# Patient Record
Sex: MALE | Race: OTHER | NOT HISPANIC OR LATINO
[De-identification: names, ages, dates, MRNs, and addresses within clinical notes are randomized per-mention and may not be internally consistent; named-entity substitution may affect disease eponyms.]

---

## 2021-07-28 ENCOUNTER — TRANSCRIPTION ENCOUNTER (OUTPATIENT)
Age: 66
End: 2021-07-28

## 2021-07-28 ENCOUNTER — INPATIENT (INPATIENT)
Facility: HOSPITAL | Age: 66
LOS: 0 days | Discharge: HOME | DRG: 287 | End: 2021-07-28
Attending: HOSPITALIST | Admitting: INTERNAL MEDICINE
Payer: COMMERCIAL

## 2021-07-28 VITALS — SYSTOLIC BLOOD PRESSURE: 130 MMHG | RESPIRATION RATE: 18 BRPM | HEART RATE: 62 BPM | DIASTOLIC BLOOD PRESSURE: 74 MMHG

## 2021-07-28 VITALS — HEIGHT: 70 IN | WEIGHT: 179.9 LBS

## 2021-07-28 DIAGNOSIS — R07.9 CHEST PAIN, UNSPECIFIED: ICD-10-CM

## 2021-07-28 LAB
ALBUMIN SERPL ELPH-MCNC: 3.8 G/DL — SIGNIFICANT CHANGE UP (ref 3.3–5.2)
ALP SERPL-CCNC: 96 U/L — SIGNIFICANT CHANGE UP (ref 40–120)
ALT FLD-CCNC: 14 U/L — SIGNIFICANT CHANGE UP
ANION GAP SERPL CALC-SCNC: 9 MMOL/L — SIGNIFICANT CHANGE UP (ref 5–17)
AST SERPL-CCNC: 18 U/L — SIGNIFICANT CHANGE UP
BASOPHILS # BLD AUTO: 0.05 K/UL — SIGNIFICANT CHANGE UP (ref 0–0.2)
BASOPHILS NFR BLD AUTO: 0.7 % — SIGNIFICANT CHANGE UP (ref 0–2)
BILIRUB SERPL-MCNC: 0.5 MG/DL — SIGNIFICANT CHANGE UP (ref 0.4–2)
BUN SERPL-MCNC: 13.3 MG/DL — SIGNIFICANT CHANGE UP (ref 8–20)
CALCIUM SERPL-MCNC: 9.5 MG/DL — SIGNIFICANT CHANGE UP (ref 8.6–10.2)
CHLORIDE SERPL-SCNC: 103 MMOL/L — SIGNIFICANT CHANGE UP (ref 98–107)
CO2 SERPL-SCNC: 27 MMOL/L — SIGNIFICANT CHANGE UP (ref 22–29)
CREAT SERPL-MCNC: 0.93 MG/DL — SIGNIFICANT CHANGE UP (ref 0.5–1.3)
EOSINOPHIL # BLD AUTO: 0.94 K/UL — HIGH (ref 0–0.5)
EOSINOPHIL NFR BLD AUTO: 13.3 % — HIGH (ref 0–6)
GLUCOSE SERPL-MCNC: 98 MG/DL — SIGNIFICANT CHANGE UP (ref 70–99)
HCT VFR BLD CALC: 44.7 % — SIGNIFICANT CHANGE UP (ref 39–50)
HGB BLD-MCNC: 14.4 G/DL — SIGNIFICANT CHANGE UP (ref 13–17)
IMM GRANULOCYTES NFR BLD AUTO: 1.4 % — SIGNIFICANT CHANGE UP (ref 0–1.5)
LYMPHOCYTES # BLD AUTO: 1.71 K/UL — SIGNIFICANT CHANGE UP (ref 1–3.3)
LYMPHOCYTES # BLD AUTO: 24.2 % — SIGNIFICANT CHANGE UP (ref 13–44)
MCHC RBC-ENTMCNC: 29.1 PG — SIGNIFICANT CHANGE UP (ref 27–34)
MCHC RBC-ENTMCNC: 32.2 GM/DL — SIGNIFICANT CHANGE UP (ref 32–36)
MCV RBC AUTO: 90.5 FL — SIGNIFICANT CHANGE UP (ref 80–100)
MONOCYTES # BLD AUTO: 0.61 K/UL — SIGNIFICANT CHANGE UP (ref 0–0.9)
MONOCYTES NFR BLD AUTO: 8.6 % — SIGNIFICANT CHANGE UP (ref 2–14)
NEUTROPHILS # BLD AUTO: 3.66 K/UL — SIGNIFICANT CHANGE UP (ref 1.8–7.4)
NEUTROPHILS NFR BLD AUTO: 51.8 % — SIGNIFICANT CHANGE UP (ref 43–77)
PLATELET # BLD AUTO: 183 K/UL — SIGNIFICANT CHANGE UP (ref 150–400)
POTASSIUM SERPL-MCNC: 4.2 MMOL/L — SIGNIFICANT CHANGE UP (ref 3.5–5.3)
POTASSIUM SERPL-SCNC: 4.2 MMOL/L — SIGNIFICANT CHANGE UP (ref 3.5–5.3)
PROT SERPL-MCNC: 7.3 G/DL — SIGNIFICANT CHANGE UP (ref 6.6–8.7)
RBC # BLD: 4.94 M/UL — SIGNIFICANT CHANGE UP (ref 4.2–5.8)
RBC # FLD: 13.9 % — SIGNIFICANT CHANGE UP (ref 10.3–14.5)
SARS-COV-2 RNA SPEC QL NAA+PROBE: SIGNIFICANT CHANGE UP
SODIUM SERPL-SCNC: 139 MMOL/L — SIGNIFICANT CHANGE UP (ref 135–145)
TROPONIN T SERPL-MCNC: <0.01 NG/ML — SIGNIFICANT CHANGE UP (ref 0–0.06)
WBC # BLD: 7.07 K/UL — SIGNIFICANT CHANGE UP (ref 3.8–10.5)
WBC # FLD AUTO: 7.07 K/UL — SIGNIFICANT CHANGE UP (ref 3.8–10.5)

## 2021-07-28 PROCEDURE — 93010 ELECTROCARDIOGRAM REPORT: CPT

## 2021-07-28 PROCEDURE — 71046 X-RAY EXAM CHEST 2 VIEWS: CPT | Mod: 26

## 2021-07-28 PROCEDURE — 99285 EMERGENCY DEPT VISIT HI MDM: CPT

## 2021-07-28 PROCEDURE — 84484 ASSAY OF TROPONIN QUANT: CPT

## 2021-07-28 PROCEDURE — C1769: CPT

## 2021-07-28 PROCEDURE — 93458 L HRT ARTERY/VENTRICLE ANGIO: CPT

## 2021-07-28 PROCEDURE — C1894: CPT

## 2021-07-28 PROCEDURE — 99153 MOD SED SAME PHYS/QHP EA: CPT

## 2021-07-28 PROCEDURE — 99223 1ST HOSP IP/OBS HIGH 75: CPT

## 2021-07-28 PROCEDURE — C1887: CPT

## 2021-07-28 PROCEDURE — 80053 COMPREHEN METABOLIC PANEL: CPT

## 2021-07-28 PROCEDURE — 71046 X-RAY EXAM CHEST 2 VIEWS: CPT

## 2021-07-28 PROCEDURE — 99152 MOD SED SAME PHYS/QHP 5/>YRS: CPT

## 2021-07-28 PROCEDURE — 93005 ELECTROCARDIOGRAM TRACING: CPT

## 2021-07-28 PROCEDURE — 85025 COMPLETE CBC W/AUTO DIFF WBC: CPT

## 2021-07-28 PROCEDURE — 36415 COLL VENOUS BLD VENIPUNCTURE: CPT

## 2021-07-28 PROCEDURE — 87635 SARS-COV-2 COVID-19 AMP PRB: CPT

## 2021-07-28 RX ORDER — LISINOPRIL 2.5 MG/1
1 TABLET ORAL
Qty: 0 | Refills: 0 | DISCHARGE

## 2021-07-28 RX ORDER — ROSUVASTATIN CALCIUM 5 MG/1
5 TABLET ORAL
Qty: 450 | Refills: 0
Start: 2021-07-28 | End: 2021-10-25

## 2021-07-28 RX ORDER — LISINOPRIL 2.5 MG/1
10 TABLET ORAL DAILY
Refills: 0 | Status: DISCONTINUED | OUTPATIENT
Start: 2021-07-28 | End: 2021-07-28

## 2021-07-28 RX ORDER — FLUTICASONE PROPIONATE 50 MCG
1 SPRAY, SUSPENSION NASAL
Qty: 0 | Refills: 0 | DISCHARGE

## 2021-07-28 RX ORDER — METOPROLOL TARTRATE 50 MG
50 TABLET ORAL DAILY
Refills: 0 | Status: DISCONTINUED | OUTPATIENT
Start: 2021-07-28 | End: 2021-07-28

## 2021-07-28 RX ORDER — ISOSORBIDE MONONITRATE 60 MG/1
15 TABLET, EXTENDED RELEASE ORAL DAILY
Refills: 0 | Status: DISCONTINUED | OUTPATIENT
Start: 2021-07-28 | End: 2021-07-28

## 2021-07-28 RX ORDER — FLUTICASONE PROPIONATE AND SALMETEROL 50; 250 UG/1; UG/1
1 POWDER ORAL; RESPIRATORY (INHALATION)
Qty: 0 | Refills: 0 | DISCHARGE

## 2021-07-28 RX ORDER — MONTELUKAST 4 MG/1
1 TABLET, CHEWABLE ORAL
Qty: 0 | Refills: 0 | DISCHARGE

## 2021-07-28 RX ORDER — ISOSORBIDE MONONITRATE 60 MG/1
15 TABLET, EXTENDED RELEASE ORAL
Qty: 90 | Refills: 3
Start: 2021-07-28 | End: 2022-07-22

## 2021-07-28 RX ORDER — ISOSORBIDE DINITRATE 5 MG/1
10 TABLET ORAL THREE TIMES A DAY
Refills: 0 | Status: DISCONTINUED | OUTPATIENT
Start: 2021-07-28 | End: 2021-07-28

## 2021-07-28 RX ORDER — DESVENLAFAXINE 50 MG/1
1 TABLET, EXTENDED RELEASE ORAL
Qty: 0 | Refills: 0 | DISCHARGE

## 2021-07-28 RX ORDER — METOPROLOL TARTRATE 50 MG
50 TABLET ORAL
Refills: 0 | Status: DISCONTINUED | OUTPATIENT
Start: 2021-07-28 | End: 2021-07-28

## 2021-07-28 RX ORDER — ISOSORBIDE DINITRATE 5 MG/1
1 TABLET ORAL
Qty: 0 | Refills: 0 | DISCHARGE

## 2021-07-28 RX ORDER — METOPROLOL TARTRATE 50 MG
1 TABLET ORAL
Qty: 0 | Refills: 0 | DISCHARGE

## 2021-07-28 RX ORDER — CLOPIDOGREL BISULFATE 75 MG/1
1 TABLET, FILM COATED ORAL
Qty: 90 | Refills: 3
Start: 2021-07-28 | End: 2022-07-22

## 2021-07-28 RX ORDER — CLOPIDOGREL BISULFATE 75 MG/1
1 TABLET, FILM COATED ORAL
Qty: 0 | Refills: 0 | DISCHARGE

## 2021-07-28 RX ORDER — CLOPIDOGREL BISULFATE 75 MG/1
75 TABLET, FILM COATED ORAL
Refills: 0 | Status: DISCONTINUED | OUTPATIENT
Start: 2021-07-28 | End: 2021-07-28

## 2021-07-28 RX ORDER — ASPIRIN/CALCIUM CARB/MAGNESIUM 324 MG
1 TABLET ORAL
Qty: 0 | Refills: 0 | DISCHARGE

## 2021-07-28 RX ORDER — ASPIRIN/CALCIUM CARB/MAGNESIUM 324 MG
81 TABLET ORAL DAILY
Refills: 0 | Status: DISCONTINUED | OUTPATIENT
Start: 2021-07-28 | End: 2021-07-28

## 2021-07-28 NOTE — ED PROVIDER NOTE - NS ED MD DISPO DIVISION
"Nataliia Menon LPN    " [facility]

## 2021-07-28 NOTE — ED PROVIDER NOTE - CLINICAL SUMMARY MEDICAL DECISION MAKING FREE TEXT BOX
chest pain while exercising this morning. ekg no acute ischemia. labs, cxr, pt contacted his cardiologist who is coming to see him in hospital. reassess

## 2021-07-28 NOTE — DISCHARGE NOTE PROVIDER - INSTRUCTIONS
Your diet should be low in fat, cholesterol, salt and carbohydrates, increase fruits (caution if diabetic), vegetables and whole grains/fiber rich foods. Take your medications for cholesterol as prescribed.

## 2021-07-28 NOTE — ED ADULT NURSE NOTE - OBJECTIVE STATEMENT
65y male PMHX of asthma, HTN, sleep apnea;  AOx4 c/o midsternal chest pain while riding exercise bike with SOB and generalized fatigue. pt contacted cardiologist Dr. Michele, pt instructed to come to ED for workup. respirations even and unlabored. denies chest discomfort. abd soft and nondistended. 65y male PMHX of asthma, HTN, sleep apnea;  AOx4 c/o midsternal chest pain while riding exercise bike with SOB and generalized fatigue. pt see's cardiologist Dr. aly for previous cardiac cath workup, found to have osteolesion, no stents placed. pt contacted cardiologist Dr. Aly, pt instructed to come to ED for eval. respirations even and unlabored, O2 sat 100% on room air. HR WNL approx 64 bpm. abd soft and nondistended, nontender to palpation. skin WNL for race. 65y male PMHX of asthma, HTN, sleep apnea;  AOx4 c/o midsternal chest pain while riding exercise bike with SOB and generalized fatigue. pt see's cardiologist Dr. aly for previous cardiac cath workup, found to have ostial lesion, no stents placed. pt contacted cardiologist Dr. Aly, pt instructed to come to ED for eval. respirations even and unlabored, O2 sat 100% on room air. HR WNL approx 64 bpm. abd soft and nondistended, nontender to palpation. skin WNL for race.

## 2021-07-28 NOTE — H&P ADULT - HISTORY OF PRESENT ILLNESS
64 yo M w/ hx CAD (cath 2017 with obstruction but not amenable to intervention) presents to ER for chest pain associated with shortness of breath.  Initially thought due to indigestion but pain now radiating to the jaw.  Has noticed pain/sob comes during exercise and noted progressive decline in his exercise tolerance.  seen by cardiology in ER, planned for inpatient cath.

## 2021-07-28 NOTE — DISCHARGE NOTE PROVIDER - CARE PROVIDER_API CALL
Bria Lawson)  Cardiovascular Disease; Critical Care Medicine; Internal Medicine; Interventional Cardiology  [address] [place] [address]  Phone: [phone]  Fax: [phone]  Follow Up Time:

## 2021-07-28 NOTE — DISCHARGE NOTE PROVIDER - NSDCCPCAREPLAN_GEN_ALL_CORE_FT
PRINCIPAL DISCHARGE DIAGNOSIS  Diagnosis: CAD (coronary artery disease)  Assessment and Plan of Treatment: Follow up with Dr. Lawson to discuss the best course of treatment to manage your coronary artery disease. Continue to take your cardiac medications as prescribed. Start crestor 5mg po daily. Take Imdur 15mg po daily. Go to the ED with any acute onset of chest pain, palpitations, shortness of breath or dizziness. Do NOT miss a dose or stop taking your Aspirin and Plavix, these medications prevent a heart attack. If anyone tells you to stop these medications, speak to your cardiologist immediately.  Managing risk factors will help prevent future blockages, risk factors may include: high blood pressure, high cholesterol, obesity, sedentary life style and smoking.    Your diet should be low in fat, cholesterol, salt and carbohydrates, increase fruits (caution if diabetic), vegetables and whole grains/fiber rich foods.   Take all your cardiac  medications as prescribed.    Do not engage in strenuous activity unless you have clearance from your cardiologist.    Follow up with your cardiologist within 1-2 weeks after your procedure.   Call your cardiologist or our unit ([phone]) with any questions or concerns that may arise.      SECONDARY DISCHARGE DIAGNOSES  Diagnosis: HTN (hypertension)  Assessment and Plan of Treatment: Continue to take antihypertensive medications as prescribed. Obtain a home blood pressure monitor (at any pharmacy or medical supply store) and monitor blood pressure trends. Call your doctor if you note you blood pressure to be running much higher or lower than usual. Limit salt intake.    Diagnosis: HLD (hyperlipidemia)  Assessment and Plan of Treatment: Your diet should be low in fat, cholesterol, salt and carbohydrates, increase fruits (caution if diabetic), vegetables and whole grains/fiber rich foods. Take your cholesterol lowering medications as prescribed. Routine follow up lipid panels

## 2021-07-28 NOTE — PROGRESS NOTE ADULT - SUBJECTIVE AND OBJECTIVE BOX
Department of Cardiology                                                                  [facility]/[place] [address] [address] [facility]                                                            Telephone: [phone]. Fax:[phone]                                                    Post- Procedure Note: Left Heart Cardiac Catheterization       Narrative:  65y  Male now s/p LHC via RRA with complex 2V CAD (LAD/LCx), collateral from the right system, procedure performed by  Dr. Lawson, received IA heparin, Fentanyl and Versed IV intraprocedurally, arrived to recovery in NAD and HDS, RRA access site stable, no bleed/hematoma, distal pulse +, plan for D/C home after recovery period completed and will follow up with Dr. Lawson in the office to further discuss the best course of management: complex PCI vs CABG vs medical management.          PAST MEDICAL & SURGICAL HISTORY:  Asthma  HTN (hypertension)  Sleep apnea        Home Medications:  Advair Diskus 100 mcg-50 mcg inhalation powder: 1 puff(s) inhaled 2 times a day (28 Jul 2021 13:35)  Aspirin Enteric Coated 81 mg oral delayed release tablet: 1 tab(s) orally once a day (28 Jul 2021 13:35)  Flonase Allergy Relief 50 mcg/inh nasal spray: 1 spray(s) nasal once a day (28 Jul 2021 13:35)  isosorbide dinitrate 10 mg oral tablet: 1 tab(s) orally once a day (28 Jul 2021 13:35)  lisinopril 10 mg oral tablet: 1 tab(s) orally once a day (28 Jul 2021 13:35)  Metoprolol Succinate ER 50 mg oral tablet, extended release: 1 tab(s) orally once a day (28 Jul 2021 13:35)  montelukast 10 mg oral tablet: 1 tab(s) orally once a day (28 Jul 2021 13:35)  Plavix 75 mg oral tablet: 1 tab(s) orally 2 times a week (28 Jul 2021 13:35)  Pristiq 50 mg oral tablet, extended release: 1 tab(s) orally once a day (28 Jul 2021 13:35)    Allergy: quinolines (Unknown)      Objective:  Vital Signs Last 24 Hrs  T(C): 36.7 (28 Jul 2021 13:30), Max: 36.7 (28 Jul 2021 13:30)  T(F): 98 (28 Jul 2021 13:30), Max: 98 (28 Jul 2021 13:30)  HR: 67 (28 Jul 2021 16:40) (60 - 67)  BP: 152/91 (28 Jul 2021 16:40) (151/84 - 157/91)  BP(mean): --  RR: 18 (28 Jul 2021 16:40) (15 - 18)  SpO2: 98% (28 Jul 2021 16:40) (98% - 100%)      PHYSICAL EXAM:  Constitutional: A & O x 3, NAD  HEENT:  Normal oral mucosa, PERRL, EOMI	  Cardiovascular: S1 S2, No murmurs, No JVD  Respiratory: Lungs clear to auscultation	  Gastrointestinal:  Soft, Non-tender, + BS	  Skin: No rashes or cyanosis  Neurologic: No deficit appreciated  Extremities: Normal range of motion, no edema  Vascular: Access site stable, no bleed or hematoma, distal pulses +     Labs:                           14.4   7.07  )-----------( 183      ( 28 Jul 2021 09:12 )             44.7     07-28    139  |  103  |  13.3  ----------------------------<  98  4.2   |  27.0  |  0.93    Ca    9.5      28 Jul 2021 09:12    TPro  7.3  /  Alb  3.8  /  TBili  0.5  /  DBili  x   /  AST  18  /  ALT  14  /  AlkPhos  96  07-28          Assessment: 64yo male with h/o Htn, Asthma, FITO on CPAP, prior cath 2017 reportedly with obstructive CAD however, not amenable to intervention at that time and treated medically, now presents to the ED c/o exertional chest pain radiating to the jaw a/w SOB and endorses recent reduced ET, now plan for LHC to be performed by Dr. Lawson.      Now s/p LHC via RRA with complex 2V CAD (LAD/LCx), collateral from the right system, procedure performed by  Dr. Lawson, received IA heparin, Fentanyl and Versed IV intraprocedurally, arrived to recovery in NAD and HDS, RRA access site stable, no bleed/hematoma, distal pulse +, plan for D/C home after recovery period completed and will follow up with Dr. Lawson in the office to further discuss the best course of management: complex PCI vs CABG vs medical management.          Plan:  -Formal cath report pending  -Post procedure management/monitoring per protocol  -Access site precautions  -Radial compression band removal at 1730  -Bedrest x 1hours post procedure  -Continue current medical therapy  -Cont anti-platelet therapy with aspirin/plavix (Dr Lawson will discuss stopping Plavix if plan for CABG)  -Cont BB with Toprol 50mg po daily   -Cont Ismo daily  -Start statin therapy with Crestor 5mg qHS  -Dr Lawson will further review and adjust meds during office visit   -Educated regarding strict adherence with DAPT   -Educated regarding post procedure management and care  -Discussed the importance of RF modification  -F/U outpt in 1-2 weeks with Cardiologist Dr. Lawson  -DISPO: Plan for D/C today after recovery period completed

## 2021-07-28 NOTE — DISCHARGE NOTE PROVIDER - HOSPITAL COURSE
Assessment: 64yo male with h/o Htn, Asthma, FITO on CPAP, prior cath 2017 reportedly with obstructive CAD however, not amenable to intervention at that time and treated medically, now presents to the ED c/o exertional chest pain radiating to the jaw a/w SOB and endorses recent reduced ET, now plan for LHC to be performed by Dr. Lawson.        Plan:  -plan for LHC via RA vs FA  -patient seen and examined  -confirmed appropriate NPO duration  -ECG and Labs reviewed  -Aspirin 81mg and Plavix 75mg po pre-cath  -procedure discussed with patient; risks and benefits explained, questions answered  -consent obtained by attending IC      Now s/p LHC via RRA with complex 2V CAD (LAD/LCx), collateral from the right system, procedure performed by  Dr. Lawson, received IA heparin, Fentanyl and Versed IV intraprocedurally, arrived to recovery in NAD and HDS, RRA access site stable, no bleed/hematoma, distal pulse +, plan for D/C home after recovery period completed and will follow up with Dr. Lawson in the office to further discuss the best course of management: complex PCI vs CABG vs medical management.          Plan:  -Formal cath report pending  -Post procedure management/monitoring per protocol  -Access site precautions  -Radial compression band removal at 1730  -Bedrest x 1hours post procedure  -Continue current medical therapy  -Cont anti-platelet therapy with aspirin/plavix (Dr Lawson will discuss stopping Plavix if plan for CABG)  -Cont BB with Toprol 50mg po daily   -Cont Ismo daily  -Start statin therapy with Crestor 5mg qHS  -Dr Lawson will further review and adjust meds during office visit   -Educated regarding strict adherence with DAPT   -Educated regarding post procedure management and care  -Discussed the importance of RF modification  -F/U outpt in 1-2 weeks with Cardiologist Dr. Lawson  -DISPO: Plan for D/C today after recovery period completed

## 2021-07-28 NOTE — DISCHARGE NOTE PROVIDER - NSDCFUADDINST_GEN_ALL_CORE_FT
Go to the ED with any acute onset of chest pain, palpitations, shortness of breath or dizziness.   Managing risk factors will help prevent future blockages, risk factors may include: high blood pressure, high cholesterol, obesity, sedentary life style and smoking.    Your diet should be low in fat, cholesterol, salt and carbohydrates, increase fruits (caution if diabetic), vegetables and whole grains/fiber rich foods.   Take all your cardiac  medications as prescribed.    Restricted use with no heavy lifting of affected arm for 48 hours.  No submerging the arm in water for 48 hours.  You may start showering today.  Call your doctor for any bleeding, swelling, loss of sensation in the hand or fingers, or fingers turning blue.  If heavy bleeding or large lumps form, hold pressure at the spot and come to the Emergency Room.    Exercise is a very important factor in heart health. Once your post procedure restrictions have passed, you should engage in heart healthy, aerobic exercise. Be sure to have clearance from your cardiologist. Cardiac rehab programs could be extremely beneficial and your cardiologist could help set this up.   Follow up with your cardiologist within 1-2 weeks after your procedure.   Call your cardiologist or our unit ([phone]) with any questions or concerns that may arise.

## 2021-07-28 NOTE — H&P ADULT - ASSESSMENT
66 yo M w/ hx CAD (cath 2017 with obstruction but not amenable to intervention) presents to ER for chest pain associated with shortness of breath.  Initially thought due to indigestion but pain now radiating to the jaw.  Has noticed pain/sob comes during exercise and noted progressive decline in his exercise tolerance.  seen by cardiology in ER, planned for inpatient cath.       chest pain, shortness of breath: concern for stable angina    cath today      hx CAD: c/w aspirin/lopressor/ isosorbide     on plavix twice daily     not on statin per patient.    dispo: once cleared by cardiology

## 2021-07-28 NOTE — DISCHARGE NOTE NURSING/CASE MANAGEMENT/SOCIAL WORK - PATIENT PORTAL LINK FT
You can access the FollowMyHealth Patient Portal offered by [facility] by registering at the following website: [email or web address]. By joining [facility]’s FollowMyHealth portal, you will also be able to view your health information using other applications (apps) compatible with our system.

## 2021-07-28 NOTE — ED PROVIDER NOTE - OBJECTIVE STATEMENT
66 y/o male hx htn c/o substernal non radiating chest pain that occurred while riding bike this morning. Daily exercise without issue, today developed symtpoms. Feels his ability to exercise has diminished progressively over time recently though due to more fatigue. C/o minor chest soreness now, no sob. No f/c, no hx dvt/pe, no smoking, no drugs. Cath in 2017 without stent.     ROS: No fever/chills. No eye pain/changes in vision, No ear pain/sore throat/dysphagia, No palpitations. No SOB/cough/. No abdominal pain, N/V/D, no black/bloody bm. No dysuria/frequency/discharge, No headache. No Dizziness.    No rashes or breaks in skin. No numbness/tingling/weakness.

## 2021-07-28 NOTE — DISCHARGE NOTE PROVIDER - NS AS DC PROVIDER CONTACT Y/N MULTI
Patient directed Bella NP to speak with son Bernard about his care needs as he is very [place], Bernard is agreeable to [facility] for SN and PT.  Orders placed by NP.Resource provided to patient for reference.   Yes

## 2021-07-28 NOTE — PROGRESS NOTE ADULT - SUBJECTIVE AND OBJECTIVE BOX
Department of Cardiology                                                                  [facility]/[place] [address] [place] [address]                                                            Telephone: [phone]. Fax:[phone]                                                                             Pre- Procedure H + P/Progress Note      HPI: 66yo male with h/o Htn, FIOT ***, prior cath 2017 reportedly with obstructive CAD however, not amenable to intervention at that time, now presents to the ED c/o exertional chest pain radiating to the jaw a/w SOB and endorses recent reduced ET, now plan for LHC to be performed by Dr. Lawson.        Symptoms:        Angina (Class): III       Ischemic Symptoms: exertional CP and SOB    Heart Failure: no       Systolic/Diastolic/Combined:        NYHA Class (within 2 weeks):     Assessment of LVEF (Must be within 6 months):       EF:        Assessed by:        Date:     Prior Cardiac Interventions:  2017 LHC with obstructive CAD, no intervention         Noninvasive Testing:   Stress Test: Date:        Protocol:        Duration of Exercise:        Symptoms:        EKG Changes:        DTS:        Myocardial Imaging:        Risk Assessment (Low, Medium, High):     Echo (Date, Findings):     Additional Diagnostics:    Risk Assessments:  ASA: 3  Mallampati: 2  Bleeding Risk: 1%  Creatinine: 0.93  GFR: 87    Associated Risk Factors:        Cerebrovascular Disease: N/A       Chronic Lung Disease: N/A       Peripheral Arterial Disease: N/A       Chronic Kidney Disease (if yes, what is GFR): N/A       Uncontrolled Diabetes (if yes, what is HgbA1C or FBS): N/A       Poorly Controlled Hypertension (if yes, what is SBP): N/A       Morbid Obesity (if yes, what is BMI): N/A       History of Recent Ventricular Arrhythmia: N/A       Inability to Ambulate Safely: N/A       Need for Therapeutic Anticoagulation: N/A       Antiplatelet or Contrast Allergy: N/A    Antianginal Therapies:        Beta Blockers: metoprolol        Calcium Channel Blockers:        Nitrates: isordil       Ranexa:     	  MEDICATIONS:  lisinopril 10 milliGRAM(s) Oral daily  metoprolol tartrate 50 milliGRAM(s) Oral two times a day  aspirin enteric coated 81 milliGRAM(s) Oral daily  clopidogrel Tablet 75 milliGRAM(s) Oral <User Schedule>        ROS: as stated above, otherwise negative    PHYSICAL EXAM:  Constitutional: A & O x 3  HEENT:   Normal oral mucosa, PERRL, EOMI	  Cardiovascular: Normal S1 S2, No JVD, No murmurs, No edema  Respiratory: Lungs clear to auscultation	  Gastrointestinal:  Soft, Non-tender, + BS	  Skin: No rashes, No ecchymoses, No cyanosis  Neurologic: Non-focal  Extremities: Normal range of motion, No clubbing, cyanosis or edema  Vascular: Peripheral pulses palpable 2+ bilaterally      T(C): 36.5 (07-28-21 @ 09:10), Max: 36.5 (07-28-21 @ 09:10)  HR: 62 (07-28-21 @ 09:10) (62 - 62)  BP: 157/91 (07-28-21 @ 09:10) (157/91 - 157/91)  RR: 15 (07-28-21 @ 09:10) (15 - 15)  SpO2: 100% (07-28-21 @ 09:10) (100% - 100%)  Wt(kg): --      I&O's Summary      Daily Height in cm: 177.8 (28 Jul 2021 08:27)      TELEMETRY: 	      ECG:  	    LABS:	 	                          14.4   7.07  )-----------( 183      ( 28 Jul 2021 09:12 )             44.7     07-28    139  |  103  |  13.3  ----------------------------<  98  4.2   |  27.0  |  0.93    Ca    9.5      28 Jul 2021 09:12    TPro  7.3  /  Alb  3.8  /  TBili  0.5  /  DBili  x   /  AST  18  /  ALT  14  /  AlkPhos  96  07-28      Tnl: 0.01    Lipid Profile:   TC  TG  LDL  HDL                                                                                   Department of Cardiology                                                                  [facility]/[place] [address] [place] [address]                                                            Telephone: [phone]. Fax:[phone]                                                                             Pre- Procedure H + P/Progress Note      HPI: 66yo male with h/o Htn, Asthma, FITO on CPAP, prior cath 2017 reportedly with obstructive CAD however, not amenable to intervention at that time and treated medically, now presents to the ED c/o exertional chest pain radiating to the jaw a/w SOB and endorses recent reduced ET, now plan for LHC to be performed by Dr. Lawson.        Symptoms:        Angina (Class): III       Ischemic Symptoms: exertional CP and SOB    Heart Failure: no       Systolic/Diastolic/Combined:        NYHA Class (within 2 weeks):     Assessment of LVEF (Must be within 6 months):       EF: 60%       Assessed by: TTE       Date: 3weeks ago    Prior Cardiac Interventions:  2017 LHC with obstructive CAD, no intervention         Noninvasive Testing:   Stress Test: Date: no recent stress test      Echo (Date, Findings): 3 weeks ago reportedly with normal LVEF 60%     CXR 7/28/21 clear     Risk Assessments:  ASA: 3  Mallampati: 2  Bleeding Risk: 1%  Creatinine: 0.93  GFR: 87    Associated Risk Factors:        Cerebrovascular Disease: N/A       Chronic Lung Disease: N/A       Peripheral Arterial Disease: N/A       Chronic Kidney Disease (if yes, what is GFR): N/A       Uncontrolled Diabetes (if yes, what is HgbA1C or FBS): N/A       Poorly Controlled Hypertension (if yes, what is SBP): N/A       Morbid Obesity (if yes, what is BMI): N/A       History of Recent Ventricular Arrhythmia: N/A       Inability to Ambulate Safely: N/A       Need for Therapeutic Anticoagulation: N/A       Antiplatelet or Contrast Allergy: N/A    Antianginal Therapies:        Beta Blockers: metoprolol        Calcium Channel Blockers:        Nitrates: Imdur       Ranexa:     	  MEDICATIONS:  lisinopril 10 milliGRAM(s) Oral daily  metoprolol tartrate 50 milliGRAM(s) Oral two times a day  aspirin enteric coated 81 milliGRAM(s) Oral daily  clopidogrel Tablet 75 milliGRAM(s) Oral <User Schedule>      ROS: as stated above, otherwise negative    PHYSICAL EXAM:  Constitutional: A & O x 3  HEENT:   Normal oral mucosa, PERRL, EOMI	  Cardiovascular: Normal S1 S2, No JVD, No murmurs, No edema  Respiratory: Lungs clear to auscultation	  Gastrointestinal:  Soft, Non-tender, + BS	  Skin: No rashes, No ecchymoses, No cyanosis  Neurologic: Non-focal  Extremities: Normal range of motion, No clubbing, cyanosis or edema  Vascular: Peripheral pulses palpable 2+ bilaterally      T(C): 36.5 (07-28-21 @ 09:10), Max: 36.5 (07-28-21 @ 09:10)  HR: 62 (07-28-21 @ 09:10) (62 - 62)  BP: 157/91 (07-28-21 @ 09:10) (157/91 - 157/91)  RR: 15 (07-28-21 @ 09:10) (15 - 15)  SpO2: 100% (07-28-21 @ 09:10) (100% - 100%)  Wt(kg): --      Daily Height in cm: 177.8 (28 Jul 2021 08:27)      TELEMETRY: SR 60's	      ECG: SR 60BPM 	    LABS:	 	                          14.4   7.07  )-----------( 183      ( 28 Jul 2021 09:12 )             44.7     07-28    139  |  103  |  13.3  ----------------------------<  98  4.2   |  27.0  |  0.93    Ca    9.5      28 Jul 2021 09:12    TPro  7.3  /  Alb  3.8  /  TBili  0.5  /  DBili  x   /  AST  18  /  ALT  14  /  AlkPhos  96  07-28      Tnl: 0.01

## 2021-07-28 NOTE — DISCHARGE NOTE PROVIDER - NSDCMRMEDTOKEN_GEN_ALL_CORE_FT
Advair Diskus 100 mcg-50 mcg inhalation powder: 1 puff(s) inhaled 2 times a day  Aspirin Enteric Coated 81 mg oral delayed release tablet: 1 tab(s) orally once a day  Crestor 5 mg oral tablet: 5 tab(s) orally once a day   Flonase Allergy Relief 50 mcg/inh nasal spray: 1 spray(s) nasal once a day  isosorbide mononitrate: 15 milligram(s) orally once a day   lisinopril 10 mg oral tablet: 1 tab(s) orally once a day  Metoprolol Succinate ER 50 mg oral tablet, extended release: 1 tab(s) orally once a day  montelukast 10 mg oral tablet: 1 tab(s) orally once a day  Plavix 75 mg oral tablet: 1 tab(s) orally 2 times a week  Pristiq 50 mg oral tablet, extended release: 1 tab(s) orally once a day

## 2021-07-28 NOTE — ED PROVIDER NOTE - PHYSICAL EXAMINATION
Gen: No acute distress, non toxic  HEENT: Mucous membranes moist, pink conjunctivae, EOMI  CV: RRR, nl s1/s2.  Resp: CTAB, normal rate and effort  GI: Abdomen soft, NT, ND. No rebound, no guarding  : No CVAT  Neuro: A&O x 3, moving all 4 extremities  MSK: No spine or joint tenderness to palpation. no significant swelling b/l LE  Skin: No rashes. intact and perfused.

## 2021-07-28 NOTE — PROGRESS NOTE ADULT - ASSESSMENT
Impression:      Plan:  -plan for LHC via RA vs FA  -patient seen and examined  -confirmed appropriate NPO duration  -ECG and Labs reviewed  -Aspirin 81mg po pre-cath  -procedure discussed with patient; risks and benefits explained, questions answered  -consent obtained by attending IC   Impression: 64yo male with h/o Htn, Asthma, FITO on CPAP, prior cath 2017 reportedly with obstructive CAD however, not amenable to intervention at that time and treated medically, now presents to the ED c/o exertional chest pain radiating to the jaw a/w SOB and endorses recent reduced ET, now plan for LHC to be performed by Dr. Lawson.       Plan:  -plan for LHC via RA vs FA  -patient seen and examined  -confirmed appropriate NPO duration  -ECG and Labs reviewed  -Aspirin 81mg and Plavix 75mg po pre-cath  -procedure discussed with patient; risks and benefits explained, questions answered  -consent obtained by attending IC

## 2021-07-28 NOTE — DISCHARGE NOTE PROVIDER - NSDCCPTREATMENT_GEN_ALL_CORE_FT
PRINCIPAL PROCEDURE  Procedure: Left heart cardiac cath  Findings and Treatment: Go to the ED with any acute onset of chest pain, palpitations, shortness of breath or dizziness.   Managing risk factors will help prevent future blockages, risk factors may include: high blood pressure, high cholesterol, obesity, sedentary life style and smoking.    Your diet should be low in fat, cholesterol, salt and carbohydrates, increase fruits (caution if diabetic), vegetables and whole grains/fiber rich foods.   Take all your cardiac  medications as prescribed.    Restricted use with no heavy lifting of affected arm for 48 hours.  No submerging the arm in water for 48 hours.  You may start showering today.  Call your doctor for any bleeding, swelling, loss of sensation in the hand or fingers, or fingers turning blue.  If heavy bleeding or large lumps form, hold pressure at the spot and come to the Emergency Room.  Exercise is a very important factor in heart health. Once your post procedure restrictions have passed, you should engage in heart healthy, aerobic exercise. Be sure to have clearance from your cardiologist. Cardiac rehab programs could be extremely beneficial and your cardiologist could help set this up.   Follow up with your cardiologist within 1-2 weeks after your procedure.   Call your cardiologist or our unit ([phone]) with any questions or concerns that may arise.

## 2021-09-13 PROBLEM — G47.30 SLEEP APNEA, UNSPECIFIED: Chronic | Status: ACTIVE | Noted: 2021-07-28

## 2021-09-13 PROBLEM — J45.909 UNSPECIFIED ASTHMA, UNCOMPLICATED: Chronic | Status: ACTIVE | Noted: 2021-07-28

## 2021-09-13 PROBLEM — I10 ESSENTIAL (PRIMARY) HYPERTENSION: Chronic | Status: ACTIVE | Noted: 2021-07-28

## 2021-09-13 PROBLEM — Z00.00 ENCOUNTER FOR PREVENTIVE HEALTH EXAMINATION: Status: ACTIVE | Noted: 2021-09-13

## 2021-09-17 ENCOUNTER — APPOINTMENT (OUTPATIENT)
Dept: CARDIOLOGY | Facility: CLINIC | Age: 66
End: 2021-09-17

## 2021-09-17 ENCOUNTER — OUTPATIENT (OUTPATIENT)
Dept: OUTPATIENT SERVICES | Facility: HOSPITAL | Age: 66
LOS: 1 days | End: 2021-09-17
Payer: COMMERCIAL

## 2021-09-17 DIAGNOSIS — R06.02 SHORTNESS OF BREATH: ICD-10-CM

## 2021-09-17 DIAGNOSIS — R07.9 CHEST PAIN, UNSPECIFIED: ICD-10-CM

## 2021-09-17 PROCEDURE — 75574 CT ANGIO HRT W/3D IMAGE: CPT | Mod: 26

## 2021-09-17 PROCEDURE — 75574 CT ANGIO HRT W/3D IMAGE: CPT

## 2022-11-11 ENCOUNTER — RESULT REVIEW (OUTPATIENT)
Age: 67
End: 2022-11-11

## 2022-11-18 ENCOUNTER — RESULT REVIEW (OUTPATIENT)
Age: 67
End: 2022-11-18

## 2024-10-04 NOTE — DISCHARGE NOTE PROVIDER - EXTENDED VTE YES NO FOR MLM ENOXAPARIN
Patient is scheduled within 60 days.  No additional chart review is needed by GI nurses.    Procedure Scheduling Information    Procedure:  Colonoscopy and EGD  Last Procedure:  2/3/2023  Procedure Date:  10/29/2024  Procedure Time:  12:00 PM  Reason:  Anemia   Referring provider:  CASPER Palacios    Location: [facility]   Prep:  [place]  Sedation:  MAC     If MAC, why:  Morbid Obesity, Multiple Comorbidities, and Previous Procedure with MAC  Pacemaker/Defibrillator:  n/a     Device Interrogation Form Faxed:  n/a  Anticoagulation:  n/a    Prescribing Provider:       ,